# Patient Record
Sex: MALE | Race: WHITE | URBAN - METROPOLITAN AREA
[De-identification: names, ages, dates, MRNs, and addresses within clinical notes are randomized per-mention and may not be internally consistent; named-entity substitution may affect disease eponyms.]

---

## 2021-05-19 ENCOUNTER — OFFICE VISIT (OUTPATIENT)
Dept: URBAN - METROPOLITAN AREA CLINIC 7 | Facility: CLINIC | Age: 66
End: 2021-05-19
Payer: COMMERCIAL

## 2021-05-19 DIAGNOSIS — H43.812 VITREOUS DEGENERATION, LEFT EYE: Primary | ICD-10-CM

## 2021-05-19 DIAGNOSIS — H33.312 HORSESHOE TEAR OF RETINA WITHOUT DETACHMENT, LEFT EYE: ICD-10-CM

## 2021-05-19 DIAGNOSIS — H33.8 OTHER RETINAL DETACHMENTS: ICD-10-CM

## 2021-05-19 PROCEDURE — 99204 OFFICE O/P NEW MOD 45 MIN: CPT | Performed by: OPHTHALMOLOGY

## 2021-05-19 PROCEDURE — 92134 CPTRZ OPH DX IMG PST SGM RTA: CPT | Performed by: OPHTHALMOLOGY

## 2021-05-19 ASSESSMENT — INTRAOCULAR PRESSURE
OD: 11
OS: 10

## 2021-05-19 NOTE — IMPRESSION/PLAN
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 Plan: Fully attached s/p RD repair in 2017 OSVALDO ZAIDI [facility]).   + h/o macula off

## 2021-05-19 NOTE — IMPRESSION/PLAN
Impression: Horseshoe tear of retina without detachment, left eye: H33.312. Plan: HST is stable s/p cryo, no new tears noted. RDW reviewed at length; RTC immediately prn dec [facility].

## 2021-05-19 NOTE — IMPRESSION/PLAN
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 Plan: Pt c/o visually significant floaters OS. Discussed RBA of obs vs PPV, pt elects observation at this time. 

1 year

## 2021-07-13 ENCOUNTER — OFFICE VISIT (OUTPATIENT)
Dept: URBAN - METROPOLITAN AREA CLINIC 41 | Facility: CLINIC | Age: 66
End: 2021-07-13
Payer: COMMERCIAL

## 2021-07-13 PROCEDURE — 92134 CPTRZ OPH DX IMG PST SGM RTA: CPT | Performed by: OPHTHALMOLOGY

## 2021-07-13 PROCEDURE — 99215 OFFICE O/P EST HI 40 MIN: CPT | Performed by: OPHTHALMOLOGY

## 2021-07-13 ASSESSMENT — INTRAOCULAR PRESSURE
OS: 11
OD: 10

## 2021-07-13 NOTE — IMPRESSION/PLAN
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 Plan: Fully attached s/p RD repair in 2017 OSVALDO ZAIDI [facility]).   + h/o macula off

## 2021-07-13 NOTE — IMPRESSION/PLAN
Impression: Vitreous degeneration, left eye: H43.692. Plan: Pt c/o visually significant floaters OS. Discussed RBA of obs vs PPV, pt elects observation at this time.

## 2021-07-13 NOTE — IMPRESSION/PLAN
Impression: Horseshoe tear of retina without detachment, left eye: H33.312. Plan: Pt c/o new floaters. There is a new HST at the posterior aspect of the cryo scar,  which could progress into a retinal detachment causing vision loss. Discussed the Dx and NHx, as well as the RBA of observation vs laser retinopexy at length. Patient elects to proceed with laser treatment of the retinal tear. Optos colors taken for documentation. 

1-2 days, laser OS (PHX-B)

## 2021-07-14 ENCOUNTER — PROCEDURE (OUTPATIENT)
Dept: URBAN - METROPOLITAN AREA CLINIC 7 | Facility: CLINIC | Age: 66
End: 2021-07-14
Payer: COMMERCIAL

## 2021-07-14 PROCEDURE — 67145 PROPH RTA DTCHMNT PC: CPT | Performed by: OPHTHALMOLOGY

## 2021-07-14 ASSESSMENT — INTRAOCULAR PRESSURE
OD: 8
OS: 6

## 2021-07-27 ENCOUNTER — POST-OPERATIVE VISIT (OUTPATIENT)
Dept: URBAN - METROPOLITAN AREA CLINIC 41 | Facility: CLINIC | Age: 66
End: 2021-07-27
Payer: COMMERCIAL

## 2021-07-27 DIAGNOSIS — H33.322 ROUND HOLE, LEFT EYE: Primary | ICD-10-CM

## 2021-07-27 PROCEDURE — 99024 POSTOP FOLLOW-UP VISIT: CPT | Performed by: OPHTHALMOLOGY

## 2021-07-27 ASSESSMENT — INTRAOCULAR PRESSURE
OD: 8
OS: 8

## 2021-07-27 NOTE — IMPRESSION/PLAN
Impression: S/P Laser Retinal Tear OS - 13 Days. Round hole, left eye  H33.322. Plan: Doing well, no new tears. 

3 months, OCT OS

## 2021-11-02 ENCOUNTER — OFFICE VISIT (OUTPATIENT)
Dept: URBAN - METROPOLITAN AREA CLINIC 41 | Facility: CLINIC | Age: 66
End: 2021-11-02
Payer: COMMERCIAL

## 2021-11-02 DIAGNOSIS — Z96.1 PRESENCE OF INTRAOCULAR LENS: ICD-10-CM

## 2021-11-02 PROCEDURE — 92134 CPTRZ OPH DX IMG PST SGM RTA: CPT | Performed by: OPHTHALMOLOGY

## 2021-11-02 PROCEDURE — 92014 COMPRE OPH EXAM EST PT 1/>: CPT | Performed by: OPHTHALMOLOGY

## 2021-11-02 ASSESSMENT — INTRAOCULAR PRESSURE
OD: 10
OS: 10

## 2021-11-02 NOTE — IMPRESSION/PLAN
Impression: Vitreous degeneration OS Plan: Pt c/o visually significant floaters OS. Discussed RBA of obs vs PPV, pt elects observation at this time.

## 2021-11-02 NOTE — IMPRESSION/PLAN
Impression: Retinal tear OS s/p laser 7/14/21 Plan: HST is stable s/p laser, no new tears noted. OCT shows no ERM formation. OK to return to excellent care of Dr. Jazzmine Mccrary full-time. RDW reviewed at length; RTC immediately prn [facility]. 

PRN